# Patient Record
Sex: MALE | Race: OTHER | HISPANIC OR LATINO | Employment: UNEMPLOYED | URBAN - METROPOLITAN AREA
[De-identification: names, ages, dates, MRNs, and addresses within clinical notes are randomized per-mention and may not be internally consistent; named-entity substitution may affect disease eponyms.]

---

## 2023-06-02 ENCOUNTER — HOSPITAL ENCOUNTER (EMERGENCY)
Facility: HOSPITAL | Age: 48
Discharge: ANOTHER HOSPITAL | End: 2023-06-02
Attending: EMERGENCY MEDICINE
Payer: MEDICAID

## 2023-06-02 ENCOUNTER — HOSPITAL ENCOUNTER (OUTPATIENT)
Facility: HOSPITAL | Age: 48
Discharge: HOME | End: 2023-06-05
Attending: INTERNAL MEDICINE | Admitting: INTERNAL MEDICINE
Payer: MEDICAID

## 2023-06-02 VITALS
TEMPERATURE: 98 F | DIASTOLIC BLOOD PRESSURE: 92 MMHG | HEIGHT: 69 IN | OXYGEN SATURATION: 99 % | SYSTOLIC BLOOD PRESSURE: 139 MMHG | WEIGHT: 204 LBS | RESPIRATION RATE: 20 BRPM | HEART RATE: 70 BPM | BODY MASS INDEX: 30.21 KG/M2

## 2023-06-02 DIAGNOSIS — K92.2 UGIB (UPPER GASTROINTESTINAL BLEED): ICD-10-CM

## 2023-06-02 DIAGNOSIS — K92.2 GI BLEED: ICD-10-CM

## 2023-06-02 DIAGNOSIS — D64.9 ANEMIA, UNSPECIFIED TYPE: ICD-10-CM

## 2023-06-02 DIAGNOSIS — R55 SYNCOPE: ICD-10-CM

## 2023-06-02 DIAGNOSIS — K92.1 MELENA: Primary | ICD-10-CM

## 2023-06-02 LAB
ALBUMIN SERPL-MCNC: 3.3 G/DL (ref 3.5–5)
ALBUMIN/GLOB SERPL: 1.2 RATIO (ref 1.1–2)
ALP SERPL-CCNC: 49 UNIT/L (ref 40–150)
ALT SERPL-CCNC: 26 UNIT/L (ref 0–55)
AMPHET UR QL SCN: NEGATIVE
APPEARANCE UR: CLEAR
APTT PPP: 23.6 SECONDS
AST SERPL-CCNC: 14 UNIT/L (ref 5–34)
BACTERIA #/AREA URNS AUTO: ABNORMAL /HPF
BARBITURATE SCN PRESENT UR: NEGATIVE
BASOPHILS # BLD AUTO: 0.03 X10(3)/MCL
BASOPHILS NFR BLD AUTO: 0.4 %
BENZODIAZ UR QL SCN: NEGATIVE
BILIRUB UR QL STRIP.AUTO: NEGATIVE MG/DL
BILIRUBIN DIRECT+TOT PNL SERPL-MCNC: 0.3 MG/DL
BUN SERPL-MCNC: 41.7 MG/DL (ref 8.9–20.6)
CALCIUM SERPL-MCNC: 8.9 MG/DL (ref 8.4–10.2)
CANNABINOIDS UR QL SCN: NEGATIVE
CHLORIDE SERPL-SCNC: 109 MMOL/L (ref 98–107)
CO2 SERPL-SCNC: 25 MMOL/L (ref 22–29)
COCAINE UR QL SCN: NEGATIVE
COLOR UR: COLORLESS
CREAT SERPL-MCNC: 0.88 MG/DL (ref 0.73–1.18)
EOSINOPHIL # BLD AUTO: 0.02 X10(3)/MCL (ref 0–0.9)
EOSINOPHIL NFR BLD AUTO: 0.2 %
ERYTHROCYTE [DISTWIDTH] IN BLOOD BY AUTOMATED COUNT: 13.5 % (ref 11.5–17)
ETHANOL SERPL-MCNC: <10 MG/DL
FENTANYL UR QL SCN: NEGATIVE
GFR SERPLBLD CREATININE-BSD FMLA CKD-EPI: >60 MLS/MIN/1.73/M2
GLOBULIN SER-MCNC: 2.8 GM/DL (ref 2.4–3.5)
GLUCOSE SERPL-MCNC: 111 MG/DL (ref 74–100)
GLUCOSE UR QL STRIP.AUTO: NORMAL MG/DL
GROUP & RH: NORMAL
HCT VFR BLD AUTO: 27.8 % (ref 42–52)
HCT VFR BLD AUTO: 28.1 % (ref 42–52)
HCT VFR BLD AUTO: 31.5 % (ref 42–52)
HGB BLD-MCNC: 10.5 G/DL (ref 14–18)
HGB BLD-MCNC: 9.2 G/DL (ref 14–18)
HGB BLD-MCNC: 9.3 G/DL (ref 14–18)
HYALINE CASTS #/AREA URNS LPF: ABNORMAL /LPF
IMM GRANULOCYTES # BLD AUTO: 0.03 X10(3)/MCL (ref 0–0.04)
IMM GRANULOCYTES NFR BLD AUTO: 0.4 %
INDIRECT COOMBS GEL: NORMAL
KETONES UR QL STRIP.AUTO: ABNORMAL MG/DL
LEUKOCYTE ESTERASE UR QL STRIP.AUTO: NEGATIVE UNIT/L
LIPASE SERPL-CCNC: 24 U/L
LYMPHOCYTES # BLD AUTO: 2.44 X10(3)/MCL (ref 0.6–4.6)
LYMPHOCYTES NFR BLD AUTO: 28.7 %
MCH RBC QN AUTO: 30 PG (ref 27–31)
MCHC RBC AUTO-ENTMCNC: 33.3 G/DL (ref 33–36)
MCV RBC AUTO: 90 FL (ref 80–94)
MDMA UR QL SCN: NEGATIVE
MONOCYTES # BLD AUTO: 0.64 X10(3)/MCL (ref 0.1–1.3)
MONOCYTES NFR BLD AUTO: 7.5 %
MUCOUS THREADS URNS QL MICRO: ABNORMAL /LPF
NEUTROPHILS # BLD AUTO: 5.35 X10(3)/MCL (ref 2.1–9.2)
NEUTROPHILS NFR BLD AUTO: 62.8 %
NITRITE UR QL STRIP.AUTO: NEGATIVE
NRBC BLD AUTO-RTO: 0 %
OPIATES UR QL SCN: NEGATIVE
PCP UR QL: NEGATIVE
PH UR STRIP.AUTO: 5 [PH]
PH UR: 5 [PH] (ref 3–11)
PLATELET # BLD AUTO: 194 X10(3)/MCL (ref 130–400)
PMV BLD AUTO: 11.8 FL (ref 7.4–10.4)
POCT GLUCOSE: 130 MG/DL (ref 70–110)
POTASSIUM SERPL-SCNC: 4.7 MMOL/L (ref 3.5–5.1)
PROT SERPL-MCNC: 6.1 GM/DL (ref 6.4–8.3)
PROT UR QL STRIP.AUTO: NEGATIVE MG/DL
RBC # BLD AUTO: 3.5 X10(6)/MCL (ref 4.7–6.1)
RBC #/AREA URNS AUTO: ABNORMAL /HPF
RBC UR QL AUTO: NEGATIVE UNIT/L
SARS-COV-2 RDRP RESP QL NAA+PROBE: NEGATIVE
SODIUM SERPL-SCNC: 139 MMOL/L (ref 136–145)
SP GR UR STRIP.AUTO: 1.03
SPECIFIC GRAVITY, URINE AUTO (.000) (OHS): 1.03 (ref 1–1.03)
SPECIMEN OUTDATE: NORMAL
SQUAMOUS #/AREA URNS LPF: ABNORMAL /HPF
TROPONIN I SERPL-MCNC: <0.01 NG/ML (ref 0–0.04)
UROBILINOGEN UR STRIP-ACNC: NORMAL MG/DL
WBC # SPEC AUTO: 8.51 X10(3)/MCL (ref 4.5–11.5)
WBC #/AREA URNS AUTO: ABNORMAL /HPF

## 2023-06-02 PROCEDURE — 93005 ELECTROCARDIOGRAM TRACING: CPT

## 2023-06-02 PROCEDURE — 82962 GLUCOSE BLOOD TEST: CPT

## 2023-06-02 PROCEDURE — C9113 INJ PANTOPRAZOLE SODIUM, VIA: HCPCS | Performed by: INTERNAL MEDICINE

## 2023-06-02 PROCEDURE — 25000003 PHARM REV CODE 250: Performed by: INTERNAL MEDICINE

## 2023-06-02 PROCEDURE — 80053 COMPREHEN METABOLIC PANEL: CPT | Performed by: EMERGENCY MEDICINE

## 2023-06-02 PROCEDURE — 85730 THROMBOPLASTIN TIME PARTIAL: CPT | Performed by: EMERGENCY MEDICINE

## 2023-06-02 PROCEDURE — G0378 HOSPITAL OBSERVATION PER HR: HCPCS

## 2023-06-02 PROCEDURE — 63600175 PHARM REV CODE 636 W HCPCS: Performed by: EMERGENCY MEDICINE

## 2023-06-02 PROCEDURE — C9113 INJ PANTOPRAZOLE SODIUM, VIA: HCPCS | Performed by: EMERGENCY MEDICINE

## 2023-06-02 PROCEDURE — 87635 SARS-COV-2 COVID-19 AMP PRB: CPT | Performed by: EMERGENCY MEDICINE

## 2023-06-02 PROCEDURE — 85025 COMPLETE CBC W/AUTO DIFF WBC: CPT | Performed by: EMERGENCY MEDICINE

## 2023-06-02 PROCEDURE — 96361 HYDRATE IV INFUSION ADD-ON: CPT

## 2023-06-02 PROCEDURE — 96374 THER/PROPH/DIAG INJ IV PUSH: CPT

## 2023-06-02 PROCEDURE — 80307 DRUG TEST PRSMV CHEM ANLYZR: CPT | Performed by: EMERGENCY MEDICINE

## 2023-06-02 PROCEDURE — 63600175 PHARM REV CODE 636 W HCPCS: Performed by: INTERNAL MEDICINE

## 2023-06-02 PROCEDURE — 81001 URINALYSIS AUTO W/SCOPE: CPT | Performed by: EMERGENCY MEDICINE

## 2023-06-02 PROCEDURE — 84484 ASSAY OF TROPONIN QUANT: CPT | Performed by: EMERGENCY MEDICINE

## 2023-06-02 PROCEDURE — 85610 PROTHROMBIN TIME: CPT | Performed by: EMERGENCY MEDICINE

## 2023-06-02 PROCEDURE — 99291 CRITICAL CARE FIRST HOUR: CPT

## 2023-06-02 PROCEDURE — 82077 ASSAY SPEC XCP UR&BREATH IA: CPT | Performed by: EMERGENCY MEDICINE

## 2023-06-02 PROCEDURE — G0379 DIRECT REFER HOSPITAL OBSERV: HCPCS

## 2023-06-02 PROCEDURE — 86900 BLOOD TYPING SEROLOGIC ABO: CPT | Performed by: EMERGENCY MEDICINE

## 2023-06-02 PROCEDURE — 85014 HEMATOCRIT: CPT | Performed by: INTERNAL MEDICINE

## 2023-06-02 PROCEDURE — 85014 HEMATOCRIT: CPT | Performed by: EMERGENCY MEDICINE

## 2023-06-02 PROCEDURE — 83690 ASSAY OF LIPASE: CPT | Performed by: EMERGENCY MEDICINE

## 2023-06-02 RX ORDER — PANTOPRAZOLE SODIUM 40 MG/10ML
80 INJECTION, POWDER, LYOPHILIZED, FOR SOLUTION INTRAVENOUS
Status: COMPLETED | OUTPATIENT
Start: 2023-06-02 | End: 2023-06-02

## 2023-06-02 RX ORDER — ACETAMINOPHEN 325 MG/1
650 TABLET ORAL EVERY 4 HOURS PRN
Status: DISCONTINUED | OUTPATIENT
Start: 2023-06-02 | End: 2023-06-05 | Stop reason: HOSPADM

## 2023-06-02 RX ORDER — NALOXONE HCL 0.4 MG/ML
0.02 VIAL (ML) INJECTION
Status: DISCONTINUED | OUTPATIENT
Start: 2023-06-02 | End: 2023-06-05 | Stop reason: HOSPADM

## 2023-06-02 RX ORDER — SODIUM CHLORIDE, SODIUM LACTATE, POTASSIUM CHLORIDE, CALCIUM CHLORIDE 600; 310; 30; 20 MG/100ML; MG/100ML; MG/100ML; MG/100ML
1000 INJECTION, SOLUTION INTRAVENOUS CONTINUOUS
Status: DISCONTINUED | OUTPATIENT
Start: 2023-06-02 | End: 2023-06-02 | Stop reason: HOSPADM

## 2023-06-02 RX ORDER — SODIUM CHLORIDE, SODIUM LACTATE, POTASSIUM CHLORIDE, CALCIUM CHLORIDE 600; 310; 30; 20 MG/100ML; MG/100ML; MG/100ML; MG/100ML
INJECTION, SOLUTION INTRAVENOUS CONTINUOUS
Status: DISCONTINUED | OUTPATIENT
Start: 2023-06-02 | End: 2023-06-03

## 2023-06-02 RX ORDER — SODIUM CHLORIDE 0.9 % (FLUSH) 0.9 %
2 SYRINGE (ML) INJECTION EVERY 12 HOURS PRN
Status: DISCONTINUED | OUTPATIENT
Start: 2023-06-02 | End: 2023-06-05 | Stop reason: HOSPADM

## 2023-06-02 RX ADMIN — SODIUM CHLORIDE, POTASSIUM CHLORIDE, SODIUM LACTATE AND CALCIUM CHLORIDE: 600; 310; 30; 20 INJECTION, SOLUTION INTRAVENOUS at 03:06

## 2023-06-02 RX ADMIN — PANTOPRAZOLE SODIUM 40 MG: 40 INJECTION, POWDER, FOR SOLUTION INTRAVENOUS at 09:06

## 2023-06-02 RX ADMIN — SODIUM CHLORIDE, POTASSIUM CHLORIDE, SODIUM LACTATE AND CALCIUM CHLORIDE 1000 ML: 600; 310; 30; 20 INJECTION, SOLUTION INTRAVENOUS at 12:06

## 2023-06-02 RX ADMIN — PANTOPRAZOLE SODIUM 80 MG: 40 INJECTION, POWDER, FOR SOLUTION INTRAVENOUS at 12:06

## 2023-06-02 NOTE — ED PROVIDER NOTES
Encounter Date: 6/2/2023       History     Chief Complaint   Patient presents with    Loss of Consciousness    Melena     PT W REPORT OF WEAKNESS W SYNCOPE X 2 THIS AM. CO ABD PAIN W NVD, REPORTS BLACK STOOL AND NECK PAIN.  NO FEVER.  .  EKG OBTAINED.       He is here with his wife, Khmer-speaking only, originally from [place], history and exam performed with the assistance of a video Khmer .  He reports that he has an underlying history of gastroesophageal reflux, previously had some type of gastritis possibly associated with alcohol use but has not had alcohol in many years.  Other past history includes appendectomy but no history of GI bleed, ulcer, or other significant problem.  He reports that he took some stomach acid medicine in the past and his gastritis resolved.  He has been in his usual state of health recently, but reports that beginning early this morning he had some type of abdominal discomfort associated with dizziness, a fall in the bathroom at home, low blood pressure, diaphoresis, and black stools on 2 occasions.  It sounds as though he passed out or nearly passed out associated with generalized weakness.  He says he checks his blood pressure at home and it was low.  No recent use of aspirin, nonsteroidal anti-inflammatory medicines, alcohol, or other things known to have gastric toxicity.  No chest pain, localizing abdominal pain, nausea, vomiting, diarrhea, or urinary complaints.  No use of aspirin or Pepto-Bismol.  He recalls at about midnight last night he cough or regurgitated up a small amount of red blood.  He does not report significant abdominal pain.  No other problems identified.    The history is provided by the patient and the spouse. The history is limited by a language barrier. A  was used.   Review of patient's allergies indicates:  No Known Allergies  History reviewed. No pertinent past medical history.  History reviewed. No pertinent  surgical history.  History reviewed. No pertinent family history.  Social History     Tobacco Use    Smoking status: Never    Smokeless tobacco: Never   Substance Use Topics    Alcohol use: Not Currently    Drug use: Not Currently     Review of Systems   Constitutional:  Negative for chills and fever.   HENT:  Negative for congestion, facial swelling, nosebleeds and sinus pressure.    Eyes:  Negative for pain and redness.   Respiratory:  Negative for chest tightness, shortness of breath and wheezing.    Cardiovascular:  Negative for chest pain, palpitations and leg swelling.   Gastrointestinal:  Negative for abdominal distention, abdominal pain, diarrhea, nausea and vomiting.        See HPI   Endocrine: Negative for cold intolerance, polydipsia and polyphagia.   Genitourinary:  Negative for difficulty urinating, dysuria, frequency and hematuria.   Musculoskeletal:  Negative for arthralgias, back pain, myalgias and neck pain.   Skin:  Negative for color change and rash.   Neurological:  Positive for syncope and weakness. Negative for dizziness, numbness and headaches.   Hematological:  Negative for adenopathy. Does not bruise/bleed easily.   Psychiatric/Behavioral:  Negative for agitation and behavioral problems.    All other systems reviewed and are negative.    Physical Exam     Initial Vitals [06/02/23 0917]   BP Pulse Resp Temp SpO2   106/70 90 18 97.5 °F (36.4 °C) 100 %      MAP       --         Physical Exam    Nursing note and vitals reviewed.  Constitutional: He appears well-developed and well-nourished. He is not diaphoretic. No distress.   HENT:   Head: Normocephalic and atraumatic.   Mouth/Throat: Oropharynx is clear and moist. No oropharyngeal exudate.   Eyes: Conjunctivae and EOM are normal. Pupils are equal, round, and reactive to light. Right eye exhibits no discharge. Left eye exhibits no discharge. No scleral icterus.   Neck: Neck supple. No thyromegaly present. No tracheal deviation present. No JVD  present.   Normal range of motion.  Cardiovascular:  Normal rate, regular rhythm and normal heart sounds.     Exam reveals no gallop and no friction rub.       No murmur heard.  Pulmonary/Chest: Breath sounds normal. No respiratory distress. He has no wheezes. He has no rhonchi. He has no rales. He exhibits no tenderness.   Abdominal: Abdomen is soft. Bowel sounds are normal. He exhibits no distension and no mass. There is no abdominal tenderness. There is no rebound and no guarding.   Genitourinary:    Genitourinary Comments: Small amount melena in the rectal vault that tests very strongly heme-positive     Musculoskeletal:         General: No tenderness or edema. Normal range of motion.      Cervical back: Normal range of motion and neck supple.     Lymphadenopathy:     He has no cervical adenopathy.   Neurological: He is alert and oriented to person, place, and time. He has normal strength. No cranial nerve deficit.   Skin: Skin is warm and dry. No rash noted. No erythema.   Psychiatric: He has a normal mood and affect. His behavior is normal. Judgment and thought content normal.       ED Course   Critical Care    Date/Time: 6/2/2023 11:47 AM  Performed by: Darion Arevalo MD  Authorized by: Darion Arevalo MD   Direct patient critical care time: 10 minutes  Ordering / reviewing critical care time: 10 minutes  Documentation critical care time: 10 minutes  Consulting other physicians critical care time: 5 minutes  Total critical care time (exclusive of procedural time) : 35 minutes  Critical care time was exclusive of separately billable procedures and treating other patients and teaching time.  Critical care was necessary to treat or prevent imminent or life-threatening deterioration of the following conditions: circulatory failure.  Critical care was time spent personally by me on the following activities: development of treatment plan with patient or surrogate, evaluation of patient's response to treatment,  examination of patient, obtaining history from patient or surrogate, ordering and performing treatments and interventions, ordering and review of laboratory studies, ordering and review of radiographic studies, pulse oximetry and re-evaluation of patient's condition.      Labs Reviewed   COMPREHENSIVE METABOLIC PANEL - Abnormal; Notable for the following components:       Result Value    Chloride 109 (*)     Glucose Level 111 (*)     Blood Urea Nitrogen 41.7 (*)     Protein Total 6.1 (*)     Albumin Level 3.3 (*)     All other components within normal limits   CBC WITH DIFFERENTIAL - Abnormal; Notable for the following components:    RBC 3.50 (*)     Hgb 10.5 (*)     Hct 31.5 (*)     MPV 11.8 (*)     All other components within normal limits   URINALYSIS, REFLEX TO URINE CULTURE - Abnormal; Notable for the following components:    Color, UA Colorless (*)     Ketones, UA 1+ (*)     Mucous, UA Trace (*)     All other components within normal limits   POCT GLUCOSE - Abnormal; Notable for the following components:    POCT Glucose 130 (*)     All other components within normal limits   LIPASE - Normal   TROPONIN I - Normal   APTT - Normal   DRUG SCREEN, URINE (BEAKER) - Normal    Narrative:     Cut off concentrations:    Amphetamines - 1000 ng/ml  Barbiturates - 200 ng/ml  Benzodiazepine - 200 ng/ml  Cannabinoids (THC) - 50 ng/ml  Cocaine - 300 ng/ml  Fentanyl - 1.0 ng/ml  MDMA - 500 ng/ml  Opiates - 300 ng/ml   Phencyclidine (PCP) - 25 ng/ml    Specimen submitted for drug analysis and tested for pH and specific gravity in order to evaluate sample integrity. Suspect tampering if specific gravity is <1.003 and/or pH is not within the range of 4.5 - 8.0  False negatives may result form substances such as bleach added to urine.  False positives may result for the presence of a substance with similar chemical structure to the drug or its metabolite.    This test provides only a PRELIMINARY analytical test result. A more  specific alternate chemical method must be used in order to obtain a confirmed analytical result. Gas chromatography/mass spectrometry (GC/MS) is the preferred confirmatory method. Other chemical confirmation methods are available. Clinical consideration and professional judgement should be applied to any drug of abuse test result, particularly when preliminary positive results are used.    Positive results will be confirmed only at the physicians request. Unconfirmed screening results are to be used only for medical purposes (treatment).        ALCOHOL,MEDICAL (ETHANOL) - Normal   SARS-COV-2 RNA AMPLIFICATION, QUAL - Normal    Narrative:     The IDNOW COVID-19 assay is a rapid molecular in vitro diagnostic test utilizing an isothermal nucleic acid amplification technology intended for the qualitative detection of nucleic acid from the SARS-CoV-2 viral RNA in direct nasal, nasopharyngeal or throat swabs from individuals who are suspected of COVID-19 by their healthcare provider.   CBC W/ AUTO DIFFERENTIAL    Narrative:     The following orders were created for panel order CBC auto differential.  Procedure                               Abnormality         Status                     ---------                               -----------         ------                     CBC with Differential[[id number]]        Abnormal            Final result                 Please view results for these tests on the individual orders.   PROTIME-INR   EXTRA TUBES    Narrative:     The following orders were created for panel order EXTRA TUBES.  Procedure                               Abnormality         Status                     ---------                               -----------         ------                     Light Blue Top Hold[[id number]]                              In process                 Red Top Hold[[id number]]                                     In process                 Gold Top Hold[[id number]]                                     In process                 Pink Top Hold[[id number]]                                    In process                   Please view results for these tests on the individual orders.   LIGHT BLUE TOP HOLD   RED TOP HOLD   GOLD TOP HOLD   PINK TOP HOLD   HEMOGLOBIN AND HEMATOCRIT, BLOOD   TYPE & SCREEN     EKG Readings: (Independently Interpreted)   Initial Reading: No STEMI. Rhythm: Normal Sinus Rhythm. Heart Rate: 87.   Normal sinus rhythm at 87 beats per minute, short DC interval, nonspecific T-wave abnormality, slightly noisy baseline, prolonged QT with  milliseconds. No prior to compare.   ECG Results              EKG 12-lead (Final result)  Result time 06/02/23 12:49:47      Final result by Interface, Lab In [facility] (06/02/23 12:49:47)                   Narrative:    Test Reason : R55,    Vent. Rate : 087 BPM     Atrial Rate : 087 BPM     P-R Int : 098 ms          QRS Dur : 080 ms      QT Int : 396 ms       P-R-T Axes : 021 053 -16 degrees     QTc Int : 476 ms    Sinus rhythm with short DC  Nonspecific T wave abnormality  Prolonged QT  Abnormal ECG  No previous ECGs available  Confirmed by Tim Torres MD ([id number]) on 6/2/2023 12:49:36 PM    Referred By:             Confirmed By:Tim Torres MD                                  Imaging Results              X-Ray Chest PA And Lateral (Final result)  Result time 06/02/23 12:31:59      Final result by Fabi Chan MD (06/02/23 12:31:59)                   Impression:      No acute abnormality of the chest.      Electronically signed by: Fabi Chan  Date:    06/02/2023  Time:    12:31               Narrative:    EXAMINATION:  XR CHEST PA AND LATERAL    CLINICAL HISTORY:  syncope;    TECHNIQUE:  PA and lateral chest radiographs    COMPARISON:  None.    FINDINGS:  The heart is normal in size.  The lungs are clear.  There is no pleural effusion or visible pneumothorax.                                        11:48 AM Clinically stable.  No further stool  production while in the ER, no change in symptoms.  History reviewed again with patient and his wife via video Guinean .  No meaningful additional information obtained.  Proceeding with evaluation and treatment of symptomatic upper GI bleed.    12:09 PM Verified no GI coverage at this facility, will pursue transfer to an appropriately capable facility.      1:25 PM Accepted for transfer to [facility] - Dr. Darryl Vee. Stable for transfer.     Medications   lactated ringers infusion (1,000 mLs Intravenous New Bag 6/2/23 1206)   lactated ringers bolus 1,000 mL (0 mLs Intravenous Stopped 6/2/23 1328)   pantoprazole injection 80 mg (80 mg Intravenous Given 6/2/23 1207)         Medical Decision Making  Problems Addressed:  Anemia, unspecified type: acute illness or injury  Melena: acute illness or injury that poses a threat to life or bodily functions  UGIB (upper gastrointestinal bleed): acute illness or injury that poses a threat to life or bodily functions    Amount and/or Complexity of Data Reviewed  Labs: ordered. Decision-making details documented in ED Course.  Radiology: ordered. Decision-making details documented in ED Course.  ECG/medicine tests: ordered and independent interpretation performed. Decision-making details documented in ED Course.    Risk  Prescription drug management.  Decision regarding hospitalization.           Additional MDM:   Differential Diagnosis:   UGIB/ Anemia/ PUD/ other source of bleeding                        Clinical Impression:   Final diagnoses:  [R55] Syncope  [K92.1] Melena (Primary)  [K92.2] UGIB (upper gastrointestinal bleed)  [D64.9] Anemia, unspecified type        ED Disposition Condition    Transfer to Another Facility Stable                Darion Arevalo MD  06/02/23 3003

## 2023-06-02 NOTE — H&P
"Hospital Medicine  History & Physical Examination       Patient: Jesús Meng  MRN: [id number]  STATUS: OP- Observation   DOS: 6/2/2023   PCP: Primary Doctor No      CC: black stools       HISTORY OF PRESENT ILLNESS   47 y.o.  male with a history that includes GERD, presented to [facility] ED with dark stools, associated with generalized weakness and syncope.  First he started this morning with "coughing up" some blood x 1.  No further vomiting or even nausea; but then started having dark, "black" stools.  Subsequently noted lightheadedness, even passed out at one point. Notes a distant history gastritis that required endoscopy.  He was told that gastritis was at least partially associated to his alcohol use.  His issues resolved and he has done well since, however does note intermittent heart burn for which he takes as needed OTC famotidine.  He notes he drinks occasionally, but he quit any heavy/regular drinking since his prior episode of gastritis.   Work up in ED noted H&H 9.3/27, heme-positive stools.  Denies any NSAID use, or alcohol use.  Patient transferred to [facility] for endoscopy.      REVIEW OF SYSTEMS   Except as documented, all other systems reviewed and negative       PAST MEDICAL HISTORY   GERD      PAST SURGICAL HISTORY   Appendectomy      FAMILY HISTORY   Reviewed, negative in relation to patient's current condition.      SOCIAL HISTORY   Denies alcohol, tobacco or drug abuse.      ALLERGIES   NKDA      HOME MEDICATIONS   None       PHYSICAL EXAM   VITALS: T     BP     P     RR     O2      GENERAL: Awake and in NAD  HEENT: NC/AT, EOMI, PERRL.  NECK: Supple,  No JVD  LUNGS: CTA B/L  CVS: RRR, S1S2 positive  GI/: Soft, NT/ND, bowel sounds positive.  EXTREMITIES: Peripheral pulses 2+, no peripheral edema  DERM: Warm, dry.  No rashes or lesions noted.  NEURO: AAOx3, no focal neurologic deficit  PSYCH: Cooperative, appropriate mood and affect       DIAGNOSTICS     Recent Labs     06/02/23  1048 " 06/02/23  1403   WBC 8.51  --    RBC 3.50*  --    HGB 10.5* 9.3*   HCT 31.5* 27.8*   MCV 90.0  --    MCH 30.0  --    MCHC 33.3  --    RDW 13.5  --      --      Recent Labs     06/02/23  1048   INR 1.03        Recent Labs     06/02/23  1048      K 4.7   CHLORIDE 109*   CO2 25   BUN 41.7*   CREATININE 0.88   GLUCOSE 111*   CALCIUM 8.9   ALBUMIN 3.3*   GLOBULIN 2.8   ALKPHOS 49   ALT 26   AST 14   BILITOT 0.3   LIPASE 24     Recent Labs     06/02/23  1048   TROPONINI <0.010        X-Ray Chest PA And Lateral  Narrative:   The heart is normal in size.  The lungs are clear.  There is no pleural effusion or visible pneumothorax.  Impression:   No acute abnormality of the chest.  Electronically signed by: Fabi Chan  Date:    06/02/2023  Time:    12:31      ASSESSMENT   Acute GI Bleed, suspect upper/gastritis  Acute blood loss anemia    PLAN   Start IV PPI BID, monitor H&H  Can transfuse for Hb <7  Ok for clears, NPO after MN  Surgery consulted for endoscopy    Prophylaxis: B/L SCDs  Code Status: Full      Darryl Sifuentes MD  Hospital Medicine        If the patient has been admitted under observation status, it is at my discretion and under our care with hospital medicine services. [TWA]

## 2023-06-03 ENCOUNTER — ANESTHESIA (OUTPATIENT)
Dept: SURGERY | Facility: HOSPITAL | Age: 48
End: 2023-06-03
Payer: MEDICAID

## 2023-06-03 ENCOUNTER — ANESTHESIA EVENT (OUTPATIENT)
Dept: SURGERY | Facility: HOSPITAL | Age: 48
End: 2023-06-03
Payer: MEDICAID

## 2023-06-03 PROBLEM — K22.10 ULCER OF ESOPHAGUS WITHOUT BLEEDING: Status: ACTIVE | Noted: 2023-06-03

## 2023-06-03 LAB
ANION GAP SERPL CALC-SCNC: 5 MEQ/L
BUN SERPL-MCNC: 26 MG/DL (ref 8.9–20.6)
CALCIUM SERPL-MCNC: 8.7 MG/DL (ref 8.4–10.2)
CHLORIDE SERPL-SCNC: 109 MMOL/L (ref 98–107)
CO2 SERPL-SCNC: 26 MMOL/L (ref 22–29)
CREAT SERPL-MCNC: 0.77 MG/DL (ref 0.73–1.18)
CREAT/UREA NIT SERPL: 34
GFR SERPLBLD CREATININE-BSD FMLA CKD-EPI: >60 MLS/MIN/1.73/M2
GLUCOSE SERPL-MCNC: 99 MG/DL (ref 74–100)
HCT VFR BLD AUTO: 27.3 % (ref 42–52)
HCT VFR BLD AUTO: 27.7 % (ref 42–52)
HGB BLD-MCNC: 8.9 G/DL (ref 14–18)
HGB BLD-MCNC: 9.2 G/DL (ref 14–18)
POTASSIUM SERPL-SCNC: 4 MMOL/L (ref 3.5–5.1)
SODIUM SERPL-SCNC: 140 MMOL/L (ref 136–145)

## 2023-06-03 PROCEDURE — 25000003 PHARM REV CODE 250: Performed by: INTERNAL MEDICINE

## 2023-06-03 PROCEDURE — D9220A PRA ANESTHESIA: ICD-10-PCS | Mod: ,,, | Performed by: NURSE ANESTHETIST, CERTIFIED REGISTERED

## 2023-06-03 PROCEDURE — 94761 N-INVAS EAR/PLS OXIMETRY MLT: CPT

## 2023-06-03 PROCEDURE — D9220A PRA ANESTHESIA: Mod: ,,, | Performed by: NURSE ANESTHETIST, CERTIFIED REGISTERED

## 2023-06-03 PROCEDURE — 00731 ANES UPR GI NDSC PX NOS: CPT | Performed by: SURGERY

## 2023-06-03 PROCEDURE — 63600175 PHARM REV CODE 636 W HCPCS: Performed by: NURSE ANESTHETIST, CERTIFIED REGISTERED

## 2023-06-03 PROCEDURE — 80048 BASIC METABOLIC PNL TOTAL CA: CPT | Performed by: INTERNAL MEDICINE

## 2023-06-03 PROCEDURE — 43235 EGD DIAGNOSTIC BRUSH WASH: CPT | Performed by: SURGERY

## 2023-06-03 PROCEDURE — C9113 INJ PANTOPRAZOLE SODIUM, VIA: HCPCS | Performed by: INTERNAL MEDICINE

## 2023-06-03 PROCEDURE — 37000008 HC ANESTHESIA 1ST 15 MINUTES: Performed by: SURGERY

## 2023-06-03 PROCEDURE — G0378 HOSPITAL OBSERVATION PER HR: HCPCS

## 2023-06-03 PROCEDURE — 63600175 PHARM REV CODE 636 W HCPCS: Performed by: INTERNAL MEDICINE

## 2023-06-03 PROCEDURE — 25000003 PHARM REV CODE 250: Performed by: NURSE ANESTHETIST, CERTIFIED REGISTERED

## 2023-06-03 PROCEDURE — 85014 HEMATOCRIT: CPT | Performed by: INTERNAL MEDICINE

## 2023-06-03 RX ORDER — PROPOFOL 10 MG/ML
VIAL (ML) INTRAVENOUS
Status: DISCONTINUED | OUTPATIENT
Start: 2023-06-03 | End: 2023-06-03

## 2023-06-03 RX ORDER — LIDOCAINE HYDROCHLORIDE 20 MG/ML
INJECTION, SOLUTION EPIDURAL; INFILTRATION; INTRACAUDAL; PERINEURAL
Status: DISCONTINUED | OUTPATIENT
Start: 2023-06-03 | End: 2023-06-03

## 2023-06-03 RX ADMIN — PANTOPRAZOLE SODIUM 40 MG: 40 INJECTION, POWDER, FOR SOLUTION INTRAVENOUS at 09:06

## 2023-06-03 RX ADMIN — PROPOFOL 50 MG: 10 INJECTION, EMULSION INTRAVENOUS at 08:06

## 2023-06-03 RX ADMIN — PROPOFOL 150 MG: 10 INJECTION, EMULSION INTRAVENOUS at 08:06

## 2023-06-03 RX ADMIN — PANTOPRAZOLE SODIUM 40 MG: 40 INJECTION, POWDER, FOR SOLUTION INTRAVENOUS at 08:06

## 2023-06-03 RX ADMIN — LIDOCAINE HYDROCHLORIDE 60 MG: 20 INJECTION, SOLUTION EPIDURAL; INFILTRATION; INTRACAUDAL; PERINEURAL at 08:06

## 2023-06-03 NOTE — HOSPITAL COURSE
6/3/23-Patient underwent EGD today and was found to have a small ulceration.  H&H remained stable.  Surgery would like to check labs in am and keep on clears for now.  Otherwise the patient is stable.    6/4/23-Patient is doing well and voices no complaints at this time.  H&H did drop slightly likely due to some dilution.  Surgery wants to repeat labs in am.    6/5/23-Patient is doing well at this time.  He was found to have a small ulceration with no active bleeding.  He iss table for discharge home today.  Exam(A&O, NAD, RRR, CTA, BS +, ROM I)

## 2023-06-03 NOTE — OP NOTE
Procedure date:  06/03/2023      Indications:  47-year-old  male transferred from outside facility for evaluation of possible upper GI blood loss and anemia.  Patient consented through interpretation system for diagnostic EGD.      Preoperative diagnosis:  1. Anemia 2. Suspicion of GI blood loss  Postoperative diagnosis:  1. Anemia 2. Superficial esophageal ulcer    Procedure performed:  Diagnostic EGD    Procedure in detail:  Patient was brought to the endoscopy suite laid in a slight supine position.  Intravenous anesthesia was provided.  Oral bite plate placed in the mouth.  An endoscope was then passed through the oropharynx intubating the esophagus with easy transit into the stomach.  Upon entry into the stomach was fully insufflated for evaluation.  Overall the gastric lumen appeared to be grossly normal.  There was no active bleeding noted.  The scope was then advanced into duodenum reaching the 2nd and 3rd portions which was also grossly normal.  It was withdrawn back in the stomach retroflexed revealing a normal appearing cardia fundus and proximal body.  It was returned to neutral position the stomach was evacuated of air.  The scope was withdrawn back to the Z-line which measured about 40 cm from the incisors.  There was a small superficial ulceration of the posterior aspect of the esophagus without active bleeding noted.  This was possible site for bleeding.  Scope was then withdrawn in neutral position the remainder of the esophagus appeared to be grossly normal otherwise.  The scope was removed patient was relieved of anesthesia stable condition and transferred to postanesthesia care unit.      Complications:  None   Estimated blood loss:  None   Specimens:  None     Disposition: Upon recovery from anesthesia patient will be transferred back to the floor for further medical management.  Patient's diet will be advanced to clear liquids and regular as tolerated.    Eli Loaiaz MD

## 2023-06-03 NOTE — PROGRESS NOTES
"[facility]  Hospital Medicine  Progress Note    Patient Name: Jesús Meng  MRN: [id number]  Patient Class: OP- Observation   Admission Date: 6/2/2023  Length of Stay: 0 days  Attending Physician: Darryl Sifuentes MD  Primary Care Provider: Primary Doctor No        Subjective:     Principal Problem:Ulcer of esophagus without bleeding        HPI:  47 y.o.  male with a history that includes GERD, presented to [facility] ED with dark stools, associated with generalized weakness and syncope.  First he started this morning with "coughing up" some blood x 1.  No further vomiting or even nausea; but then started having dark, "black" stools.  Subsequently noted lightheadedness, even passed out at one point. Notes a distant history gastritis that required endoscopy.  He was told that gastritis was at least partially associated to his alcohol use.  His issues resolved and he has done well since, however does note intermittent heart burn for which he takes as needed OTC famotidine.  He notes he drinks occasionally, but he quit any heavy/regular drinking since his prior episode of gastritis.   Work up in ED noted H&H 9.3/27, heme-positive stools.  Denies any NSAID use, or alcohol use.  Patient transferred to [facility] for endoscopy      Overview/Hospital Course:  6/3/23-Patient underwent EGD today and was found to have a small ulceration.  H&H remained stable.  Surgery would like to check labs in am and keep on clears for now.  Otherwise the patient is stable.      Interval History:     Review of Systems   Constitutional: Negative.    HENT: Negative.     Eyes: Negative.    Respiratory: Negative.     Cardiovascular: Negative.    Gastrointestinal:  Positive for blood in stool.   Endocrine: Negative.    Genitourinary: Negative.    Musculoskeletal: Negative.    Skin: Negative.    Allergic/Immunologic: Negative.    Neurological: Negative.    Hematological: Negative.    Psychiatric/Behavioral: " Negative.     Objective:     Vital Signs (Most Recent):  Temp: 98 °F (36.7 °C) (06/03/23 1220)  Pulse: 71 (06/03/23 1220)  Resp: 20 (06/03/23 0000)  BP: 135/87 (06/03/23 1220)  SpO2: 100 % (06/03/23 1220) Vital Signs (24h Range):  Temp:  [97.6 °F (36.4 °C)-98.6 °F (37 °C)] 98 °F (36.7 °C)  Pulse:  [67-77] 71  Resp:  [20] 20  SpO2:  [99 %-100 %] 100 %  BP: (110-152)/(77-97) 135/87     Weight: 92.5 kg (203 lb 14.8 oz)  Body mass index is 30.11 kg/m².    Intake/Output Summary (Last 24 hours) at 6/3/2023 1514  Last data filed at 6/3/2023 0816  Gross per 24 hour   Intake 100 ml   Output 0 ml   Net 100 ml         Physical Exam  Constitutional:       Appearance: Normal appearance. He is normal weight.   HENT:      Head: Normocephalic and atraumatic.      Nose: Nose normal.      Mouth/Throat:      Mouth: Mucous membranes are moist.      Pharynx: Oropharynx is clear.   Eyes:      Extraocular Movements: Extraocular movements intact.      Conjunctiva/sclera: Conjunctivae normal.      Pupils: Pupils are equal, round, and reactive to light.   Cardiovascular:      Rate and Rhythm: Normal rate.      Pulses: Normal pulses.      Heart sounds: Normal heart sounds.   Pulmonary:      Effort: Pulmonary effort is normal.      Breath sounds: Normal breath sounds.   Abdominal:      General: Bowel sounds are normal.      Palpations: Abdomen is soft.   Musculoskeletal:         General: Normal range of motion.      Cervical back: Normal range of motion and neck supple.   Skin:     General: Skin is warm and dry.      Capillary Refill: Capillary refill takes 2 to 3 seconds.   Neurological:      General: No focal deficit present.      Mental Status: He is alert and oriented to person, place, and time. Mental status is at baseline.   Psychiatric:         Mood and Affect: Mood normal.         Behavior: Behavior normal.         Thought Content: Thought content normal.         Judgment: Judgment normal.           Significant Labs: All pertinent labs  within the past 24 hours have been reviewed.  BMP:   Recent Labs   Lab 06/03/23  0338      K 4.0   CO2 26   BUN 26.0*   CREATININE 0.77   CALCIUM 8.7     CBC:   Recent Labs   Lab 06/02/23  1048 06/02/23  1403 06/02/23  1942 06/03/23  0338 06/03/23  1134   WBC 8.51  --   --   --   --    HGB 10.5*   < > 9.2* 9.2* 8.9*   HCT 31.5*   < > 28.1* 27.7* 27.3*     --   --   --   --     < > = values in this interval not displayed.     CMP:   Recent Labs   Lab 06/02/23  1048 06/03/23  0338    140   K 4.7 4.0   CO2 25 26   BUN 41.7* 26.0*   CREATININE 0.88 0.77   CALCIUM 8.9 8.7   ALBUMIN 3.3*  --    BILITOT 0.3  --    ALKPHOS 49  --    AST 14  --    ALT 26  --      Magnesium: No results for input(s): MG in the last 48 hours.    Significant Imaging: I have reviewed all pertinent imaging results/findings within the past 24 hours.      Assessment/Plan:      * Ulcer of esophagus without bleeding  PPI  Follow labs  Clears for now        VTE Risk Mitigation (From admission, onward)         Ordered     IP VTE HIGH RISK PATIENT  Once         06/02/23 1645     Place sequential compression device  Until discontinued         06/02/23 1645                Discharge Planning   TIMBO:      Code Status: Full Code   Is the patient medically ready for discharge?:     Reason for patient still in hospital (select all that apply): Patient trending condition, Laboratory test, Treatment, Consult recommendations and Pending disposition                     Alvin Day MD  Department of Hospital Medicine   [facility]

## 2023-06-03 NOTE — ANESTHESIA POSTPROCEDURE EVALUATION
Anesthesia Post Evaluation    Patient: Jesús Meng    Procedure(s) Performed: Procedure(s) (LRB):  EGD (ESOPHAGOGASTRODUODENOSCOPY) (N/A)    Final Anesthesia Type: general      Patient participation: Yes- Able to Participate  Level of consciousness: awake and alert and oriented  Post-procedure vital signs: reviewed and stable  Pain management: adequate  Airway patency: patent    PONV status at discharge: No PONV  Anesthetic complications: no      Cardiovascular status: stable  Respiratory status: unassisted, spontaneous ventilation and room air  Hydration status: euvolemic  Follow-up not needed.          Vitals Value Taken Time     06/03/23 0830     06/03/23 0830     06/03/23 0830     06/03/23 0830     06/03/23 0830         No case tracking events are documented in the log.      Pain/Luke Score: No data recorded

## 2023-06-03 NOTE — SUBJECTIVE & OBJECTIVE
Interval History:     Review of Systems   Constitutional: Negative.    HENT: Negative.     Eyes: Negative.    Respiratory: Negative.     Cardiovascular: Negative.    Gastrointestinal:  Positive for blood in stool.   Endocrine: Negative.    Genitourinary: Negative.    Musculoskeletal: Negative.    Skin: Negative.    Allergic/Immunologic: Negative.    Neurological: Negative.    Hematological: Negative.    Psychiatric/Behavioral: Negative.     Objective:     Vital Signs (Most Recent):  Temp: 98 °F (36.7 °C) (06/03/23 1220)  Pulse: 71 (06/03/23 1220)  Resp: 20 (06/03/23 0000)  BP: 135/87 (06/03/23 1220)  SpO2: 100 % (06/03/23 1220) Vital Signs (24h Range):  Temp:  [97.6 °F (36.4 °C)-98.6 °F (37 °C)] 98 °F (36.7 °C)  Pulse:  [67-77] 71  Resp:  [20] 20  SpO2:  [99 %-100 %] 100 %  BP: (110-152)/(77-97) 135/87     Weight: 92.5 kg (203 lb 14.8 oz)  Body mass index is 30.11 kg/m².    Intake/Output Summary (Last 24 hours) at 6/3/2023 1514  Last data filed at 6/3/2023 0816  Gross per 24 hour   Intake 100 ml   Output 0 ml   Net 100 ml         Physical Exam  Constitutional:       Appearance: Normal appearance. He is normal weight.   HENT:      Head: Normocephalic and atraumatic.      Nose: Nose normal.      Mouth/Throat:      Mouth: Mucous membranes are moist.      Pharynx: Oropharynx is clear.   Eyes:      Extraocular Movements: Extraocular movements intact.      Conjunctiva/sclera: Conjunctivae normal.      Pupils: Pupils are equal, round, and reactive to light.   Cardiovascular:      Rate and Rhythm: Normal rate.      Pulses: Normal pulses.      Heart sounds: Normal heart sounds.   Pulmonary:      Effort: Pulmonary effort is normal.      Breath sounds: Normal breath sounds.   Abdominal:      General: Bowel sounds are normal.      Palpations: Abdomen is soft.   Musculoskeletal:         General: Normal range of motion.      Cervical back: Normal range of motion and neck supple.   Skin:     General: Skin is warm and dry.       Capillary Refill: Capillary refill takes 2 to 3 seconds.   Neurological:      General: No focal deficit present.      Mental Status: He is alert and oriented to person, place, and time. Mental status is at baseline.   Psychiatric:         Mood and Affect: Mood normal.         Behavior: Behavior normal.         Thought Content: Thought content normal.         Judgment: Judgment normal.           Significant Labs: All pertinent labs within the past 24 hours have been reviewed.  BMP:   Recent Labs   Lab 06/03/23  0338      K 4.0   CO2 26   BUN 26.0*   CREATININE 0.77   CALCIUM 8.7     CBC:   Recent Labs   Lab 06/02/23  1048 06/02/23  1403 06/02/23  1942 06/03/23  0338 06/03/23  1134   WBC 8.51  --   --   --   --    HGB 10.5*   < > 9.2* 9.2* 8.9*   HCT 31.5*   < > 28.1* 27.7* 27.3*     --   --   --   --     < > = values in this interval not displayed.     CMP:   Recent Labs   Lab 06/02/23  1048 06/03/23  0338    140   K 4.7 4.0   CO2 25 26   BUN 41.7* 26.0*   CREATININE 0.88 0.77   CALCIUM 8.9 8.7   ALBUMIN 3.3*  --    BILITOT 0.3  --    ALKPHOS 49  --    AST 14  --    ALT 26  --      Magnesium: No results for input(s): MG in the last 48 hours.    Significant Imaging: I have reviewed all pertinent imaging results/findings within the past 24 hours.

## 2023-06-03 NOTE — HPI
"47 y.o.  male with a history that includes GERD, presented to [facility] ED with dark stools, associated with generalized weakness and syncope.  First he started this morning with "coughing up" some blood x 1.  No further vomiting or even nausea; but then started having dark, "black" stools.  Subsequently noted lightheadedness, even passed out at one point. Notes a distant history gastritis that required endoscopy.  He was told that gastritis was at least partially associated to his alcohol use.  His issues resolved and he has done well since, however does note intermittent heart burn for which he takes as needed OTC famotidine.  He notes he drinks occasionally, but he quit any heavy/regular drinking since his prior episode of gastritis.   Work up in ED noted H&H 9.3/27, heme-positive stools.  Denies any NSAID use, or alcohol use.  Patient transferred to [facility] for endoscopy  "

## 2023-06-03 NOTE — ANESTHESIA PREPROCEDURE EVALUATION
06/03/2023  Jesús Meng is a 47 y.o., male.      Pre-op Assessment    I have reviewed the Patient Summary Reports.     I have reviewed the Nursing Notes. I have reviewed the NPO Status.   I have reviewed the Medications.     Review of Systems  Anesthesia Hx:  No problems with previous Anesthesia  History of prior surgery of interest to airway management or planning: Denies Family Hx of Anesthesia complications.   Denies Personal Hx of Anesthesia complications.   Social:  Non-Smoker, No Alcohol Use    Hematology/Oncology:  Hematology Normal   Oncology Normal     EENT/Dental:EENT/Dental Normal   Cardiovascular:  Cardiovascular Normal     Pulmonary:  Pulmonary Normal    Renal/:  Renal/ Normal     Hepatic/GI:   PUD, GERD    Musculoskeletal:  Musculoskeletal Normal    Neurological:  Neurology Normal    Endocrine:  Endocrine Normal    Dermatological:  Skin Normal    Psych:  Psychiatric Normal           Physical Exam  General: Well nourished, Cooperative, Alert and Oriented    Airway:  Mallampati: II   Mouth Opening: Normal  TM Distance: Normal  Tongue: Normal  Neck ROM: Normal ROM    Dental:  Intact        Anesthesia Plan  Type of Anesthesia, risks & benefits discussed:    Anesthesia Type: Gen Natural Airway  Intra-op Monitoring Plan: Standard ASA Monitors  Induction:  IV  Informed Consent: Informed consent signed with the Patient and all parties understand the risks and agree with anesthesia plan.  All questions answered. Patient consented to blood products? Yes  ASA Score: 1 Emergent  Day of Surgery Review of History & Physical: I have interviewed and examined the patient. I have reviewed the patient's H&P dated: There are no significant changes.     Ready For Surgery From Anesthesia Perspective.     .

## 2023-06-04 LAB
ALBUMIN SERPL-MCNC: 3.1 G/DL (ref 3.5–5)
ALBUMIN/GLOB SERPL: 1.2 RATIO (ref 1.1–2)
ALP SERPL-CCNC: 43 UNIT/L (ref 40–150)
ALT SERPL-CCNC: 29 UNIT/L (ref 0–55)
AST SERPL-CCNC: 22 UNIT/L (ref 5–34)
BASOPHILS # BLD AUTO: 0.05 X10(3)/MCL
BASOPHILS NFR BLD AUTO: 0.9 %
BILIRUBIN DIRECT+TOT PNL SERPL-MCNC: 0.3 MG/DL
BUN SERPL-MCNC: 15 MG/DL (ref 8.9–20.6)
CALCIUM SERPL-MCNC: 8.6 MG/DL (ref 8.4–10.2)
CHLORIDE SERPL-SCNC: 109 MMOL/L (ref 98–107)
CO2 SERPL-SCNC: 26 MMOL/L (ref 22–29)
CREAT SERPL-MCNC: 0.96 MG/DL (ref 0.73–1.18)
EOSINOPHIL # BLD AUTO: 0.1 X10(3)/MCL (ref 0–0.9)
EOSINOPHIL NFR BLD AUTO: 1.8 %
ERYTHROCYTE [DISTWIDTH] IN BLOOD BY AUTOMATED COUNT: 13.2 % (ref 11.5–17)
GFR SERPLBLD CREATININE-BSD FMLA CKD-EPI: >60 MLS/MIN/1.73/M2
GLOBULIN SER-MCNC: 2.5 GM/DL (ref 2.4–3.5)
GLUCOSE SERPL-MCNC: 91 MG/DL (ref 74–100)
HCT VFR BLD AUTO: 25.6 % (ref 42–52)
HGB BLD-MCNC: 8.3 G/DL (ref 14–18)
IMM GRANULOCYTES # BLD AUTO: 0.01 X10(3)/MCL (ref 0–0.04)
IMM GRANULOCYTES NFR BLD AUTO: 0.2 %
LYMPHOCYTES # BLD AUTO: 2.61 X10(3)/MCL (ref 0.6–4.6)
LYMPHOCYTES NFR BLD AUTO: 46.1 %
MAGNESIUM SERPL-MCNC: 1.9 MG/DL (ref 1.6–2.6)
MCH RBC QN AUTO: 29.5 PG (ref 27–31)
MCHC RBC AUTO-ENTMCNC: 32.4 G/DL (ref 33–36)
MCV RBC AUTO: 91.1 FL (ref 80–94)
MONOCYTES # BLD AUTO: 0.43 X10(3)/MCL (ref 0.1–1.3)
MONOCYTES NFR BLD AUTO: 7.6 %
NEUTROPHILS # BLD AUTO: 2.46 X10(3)/MCL (ref 2.1–9.2)
NEUTROPHILS NFR BLD AUTO: 43.4 %
PLATELET # BLD AUTO: 171 X10(3)/MCL (ref 130–400)
PMV BLD AUTO: 12 FL (ref 7.4–10.4)
POTASSIUM SERPL-SCNC: 3.8 MMOL/L (ref 3.5–5.1)
PROT SERPL-MCNC: 5.6 GM/DL (ref 6.4–8.3)
RBC # BLD AUTO: 2.81 X10(6)/MCL (ref 4.7–6.1)
SODIUM SERPL-SCNC: 141 MMOL/L (ref 136–145)
WBC # SPEC AUTO: 5.66 X10(3)/MCL (ref 4.5–11.5)

## 2023-06-04 PROCEDURE — 80053 COMPREHEN METABOLIC PANEL: CPT | Performed by: INTERNAL MEDICINE

## 2023-06-04 PROCEDURE — C9113 INJ PANTOPRAZOLE SODIUM, VIA: HCPCS | Performed by: INTERNAL MEDICINE

## 2023-06-04 PROCEDURE — 83735 ASSAY OF MAGNESIUM: CPT | Performed by: INTERNAL MEDICINE

## 2023-06-04 PROCEDURE — 63600175 PHARM REV CODE 636 W HCPCS: Performed by: INTERNAL MEDICINE

## 2023-06-04 PROCEDURE — 85025 COMPLETE CBC W/AUTO DIFF WBC: CPT | Performed by: INTERNAL MEDICINE

## 2023-06-04 PROCEDURE — G0378 HOSPITAL OBSERVATION PER HR: HCPCS

## 2023-06-04 PROCEDURE — 94761 N-INVAS EAR/PLS OXIMETRY MLT: CPT

## 2023-06-04 PROCEDURE — 25000003 PHARM REV CODE 250: Performed by: INTERNAL MEDICINE

## 2023-06-04 RX ADMIN — PANTOPRAZOLE SODIUM 40 MG: 40 INJECTION, POWDER, FOR SOLUTION INTRAVENOUS at 09:06

## 2023-06-04 RX ADMIN — PANTOPRAZOLE SODIUM 40 MG: 40 INJECTION, POWDER, FOR SOLUTION INTRAVENOUS at 10:06

## 2023-06-04 NOTE — SUBJECTIVE & OBJECTIVE
Interval History:     Review of Systems   Constitutional: Negative.    HENT: Negative.     Eyes: Negative.    Respiratory: Negative.     Cardiovascular: Negative.    Gastrointestinal: Negative.    Endocrine: Negative.    Genitourinary: Negative.    Musculoskeletal: Negative.    Skin: Negative.    Allergic/Immunologic: Negative.    Neurological: Negative.    Hematological: Negative.    Psychiatric/Behavioral: Negative.     Objective:     Vital Signs (Most Recent):  Temp: 97.8 °F (36.6 °C) (06/04/23 1231)  Pulse: 61 (06/04/23 1231)  Resp: 20 (06/03/23 0000)  BP: (!) 140/85 (06/04/23 1231)  SpO2: 99 % (06/04/23 1231) Vital Signs (24h Range):  Temp:  [97.5 °F (36.4 °C)-99 °F (37.2 °C)] 97.8 °F (36.6 °C)  Pulse:  [61-74] 61  SpO2:  [96 %-100 %] 99 %  BP: (117-147)/(73-92) 140/85     Weight: 92.5 kg (203 lb 14.8 oz)  Body mass index is 30.11 kg/m².    Intake/Output Summary (Last 24 hours) at 6/4/2023 1500  Last data filed at 6/3/2023 2245  Gross per 24 hour   Intake 1320 ml   Output --   Net 1320 ml         Physical Exam  Constitutional:       Appearance: Normal appearance. He is normal weight.   HENT:      Head: Normocephalic and atraumatic.      Nose: Nose normal.      Mouth/Throat:      Mouth: Mucous membranes are moist.      Pharynx: Oropharynx is clear.   Eyes:      Extraocular Movements: Extraocular movements intact.      Conjunctiva/sclera: Conjunctivae normal.      Pupils: Pupils are equal, round, and reactive to light.   Cardiovascular:      Rate and Rhythm: Normal rate and regular rhythm.      Pulses: Normal pulses.      Heart sounds: Normal heart sounds.   Pulmonary:      Effort: Pulmonary effort is normal.      Breath sounds: Normal breath sounds.   Abdominal:      General: Bowel sounds are normal.      Palpations: Abdomen is soft.   Musculoskeletal:         General: Normal range of motion.      Cervical back: Normal range of motion and neck supple.   Skin:     General: Skin is warm and dry.      Capillary  Refill: Capillary refill takes 2 to 3 seconds.   Neurological:      General: No focal deficit present.      Mental Status: He is alert and oriented to person, place, and time. Mental status is at baseline.   Psychiatric:         Mood and Affect: Mood normal.         Behavior: Behavior normal.         Thought Content: Thought content normal.         Judgment: Judgment normal.           Significant Labs: All pertinent labs within the past 24 hours have been reviewed.  BMP:   Recent Labs   Lab 06/04/23  0147      K 3.8   CO2 26   BUN 15.0   CREATININE 0.96   CALCIUM 8.6   MG 1.90     CBC:   Recent Labs   Lab 06/03/23  0338 06/03/23  1134 06/04/23 0147   WBC  --   --  5.66   HGB 9.2* 8.9* 8.3*   HCT 27.7* 27.3* 25.6*   PLT  --   --  171     CMP:   Recent Labs   Lab 06/03/23  0338 06/04/23  0147    141   K 4.0 3.8   CO2 26 26   BUN 26.0* 15.0   CREATININE 0.77 0.96   CALCIUM 8.7 8.6   ALBUMIN  --  3.1*   BILITOT  --  0.3   ALKPHOS  --  43   AST  --  22   ALT  --  29     Magnesium:   Recent Labs   Lab 06/04/23  0147   MG 1.90       Significant Imaging: I have reviewed all pertinent imaging results/findings within the past 24 hours.

## 2023-06-04 NOTE — PROGRESS NOTES
"[facility]  Hospital Medicine  Progress Note    Patient Name: Jesús Meng  MRN: [id number]  Patient Class: OP- Observation   Admission Date: 6/2/2023  Length of Stay: 0 days  Attending Physician: Darryl Sifuentes MD  Primary Care Provider: Primary Doctor No        Subjective:     Principal Problem:Ulcer of esophagus without bleeding        HPI:  47 y.o.  male with a history that includes GERD, presented to [facility] ED with dark stools, associated with generalized weakness and syncope.  First he started this morning with "coughing up" some blood x 1.  No further vomiting or even nausea; but then started having dark, "black" stools.  Subsequently noted lightheadedness, even passed out at one point. Notes a distant history gastritis that required endoscopy.  He was told that gastritis was at least partially associated to his alcohol use.  His issues resolved and he has done well since, however does note intermittent heart burn for which he takes as needed OTC famotidine.  He notes he drinks occasionally, but he quit any heavy/regular drinking since his prior episode of gastritis.   Work up in ED noted H&H 9.3/27, heme-positive stools.  Denies any NSAID use, or alcohol use.  Patient transferred to [facility] for endoscopy      Overview/Hospital Course:  6/3/23-Patient underwent EGD today and was found to have a small ulceration.  H&H remained stable.  Surgery would like to check labs in am and keep on clears for now.  Otherwise the patient is stable.    6/4/23-Patient is doing well and voices no complaints at this time.  H&H did drop slightly likely due to some dilution.  Surgery wants to repeat labs in am.      Interval History:     Review of Systems   Constitutional: Negative.    HENT: Negative.     Eyes: Negative.    Respiratory: Negative.     Cardiovascular: Negative.    Gastrointestinal: Negative.    Endocrine: Negative.    Genitourinary: Negative.    Musculoskeletal: Negative.  "   Skin: Negative.    Allergic/Immunologic: Negative.    Neurological: Negative.    Hematological: Negative.    Psychiatric/Behavioral: Negative.     Objective:     Vital Signs (Most Recent):  Temp: 97.8 °F (36.6 °C) (06/04/23 1231)  Pulse: 61 (06/04/23 1231)  Resp: 20 (06/03/23 0000)  BP: (!) 140/85 (06/04/23 1231)  SpO2: 99 % (06/04/23 1231) Vital Signs (24h Range):  Temp:  [97.5 °F (36.4 °C)-99 °F (37.2 °C)] 97.8 °F (36.6 °C)  Pulse:  [61-74] 61  SpO2:  [96 %-100 %] 99 %  BP: (117-147)/(73-92) 140/85     Weight: 92.5 kg (203 lb 14.8 oz)  Body mass index is 30.11 kg/m².    Intake/Output Summary (Last 24 hours) at 6/4/2023 1500  Last data filed at 6/3/2023 2245  Gross per 24 hour   Intake 1320 ml   Output --   Net 1320 ml         Physical Exam  Constitutional:       Appearance: Normal appearance. He is normal weight.   HENT:      Head: Normocephalic and atraumatic.      Nose: Nose normal.      Mouth/Throat:      Mouth: Mucous membranes are moist.      Pharynx: Oropharynx is clear.   Eyes:      Extraocular Movements: Extraocular movements intact.      Conjunctiva/sclera: Conjunctivae normal.      Pupils: Pupils are equal, round, and reactive to light.   Cardiovascular:      Rate and Rhythm: Normal rate and regular rhythm.      Pulses: Normal pulses.      Heart sounds: Normal heart sounds.   Pulmonary:      Effort: Pulmonary effort is normal.      Breath sounds: Normal breath sounds.   Abdominal:      General: Bowel sounds are normal.      Palpations: Abdomen is soft.   Musculoskeletal:         General: Normal range of motion.      Cervical back: Normal range of motion and neck supple.   Skin:     General: Skin is warm and dry.      Capillary Refill: Capillary refill takes 2 to 3 seconds.   Neurological:      General: No focal deficit present.      Mental Status: He is alert and oriented to person, place, and time. Mental status is at baseline.   Psychiatric:         Mood and Affect: Mood normal.         Behavior:  Behavior normal.         Thought Content: Thought content normal.         Judgment: Judgment normal.           Significant Labs: All pertinent labs within the past 24 hours have been reviewed.  BMP:   Recent Labs   Lab 06/04/23  0147      K 3.8   CO2 26   BUN 15.0   CREATININE 0.96   CALCIUM 8.6   MG 1.90     CBC:   Recent Labs   Lab 06/03/23  0338 06/03/23  1134 06/04/23  0147   WBC  --   --  5.66   HGB 9.2* 8.9* 8.3*   HCT 27.7* 27.3* 25.6*   PLT  --   --  171     CMP:   Recent Labs   Lab 06/03/23  0338 06/04/23  0147    141   K 4.0 3.8   CO2 26 26   BUN 26.0* 15.0   CREATININE 0.77 0.96   CALCIUM 8.7 8.6   ALBUMIN  --  3.1*   BILITOT  --  0.3   ALKPHOS  --  43   AST  --  22   ALT  --  29     Magnesium:   Recent Labs   Lab 06/04/23  0147   MG 1.90       Significant Imaging: I have reviewed all pertinent imaging results/findings within the past 24 hours.      Assessment/Plan:      * Ulcer of esophagus without bleeding  PPI  Follow labs  Clears for now        VTE Risk Mitigation (From admission, onward)         Ordered     IP VTE HIGH RISK PATIENT  Once         06/02/23 1645     Place sequential compression device  Until discontinued         06/02/23 1645              Check CBC in am and if stable d/c  Surgery wanting to observe an additional night  Resume current meds  Full liquid diet  Discharge Planning   TIMBO:      Code Status: Full Code   Is the patient medically ready for discharge?:     Reason for patient still in hospital (select all that apply): Patient trending condition, Laboratory test, Treatment, Consult recommendations and Pending disposition                     Alvin Day MD  Department of Hospital Medicine   [facility]

## 2023-06-05 VITALS
BODY MASS INDEX: 30.21 KG/M2 | HEART RATE: 60 BPM | WEIGHT: 203.94 LBS | SYSTOLIC BLOOD PRESSURE: 145 MMHG | RESPIRATION RATE: 20 BRPM | DIASTOLIC BLOOD PRESSURE: 91 MMHG | HEIGHT: 69 IN | TEMPERATURE: 98 F | OXYGEN SATURATION: 99 %

## 2023-06-05 LAB
BASOPHILS # BLD AUTO: 0.03 X10(3)/MCL
BASOPHILS NFR BLD AUTO: 0.6 %
EOSINOPHIL # BLD AUTO: 0.11 X10(3)/MCL (ref 0–0.9)
EOSINOPHIL NFR BLD AUTO: 2 %
ERYTHROCYTE [DISTWIDTH] IN BLOOD BY AUTOMATED COUNT: 13.2 % (ref 11.5–17)
HCT VFR BLD AUTO: 25 % (ref 42–52)
HGB BLD-MCNC: 8.1 G/DL (ref 14–18)
IMM GRANULOCYTES # BLD AUTO: 0.01 X10(3)/MCL (ref 0–0.04)
IMM GRANULOCYTES NFR BLD AUTO: 0.2 %
LYMPHOCYTES # BLD AUTO: 2.46 X10(3)/MCL (ref 0.6–4.6)
LYMPHOCYTES NFR BLD AUTO: 45.5 %
MCH RBC QN AUTO: 29.7 PG (ref 27–31)
MCHC RBC AUTO-ENTMCNC: 32.4 G/DL (ref 33–36)
MCV RBC AUTO: 91.6 FL (ref 80–94)
MONOCYTES # BLD AUTO: 0.53 X10(3)/MCL (ref 0.1–1.3)
MONOCYTES NFR BLD AUTO: 9.8 %
NEUTROPHILS # BLD AUTO: 2.27 X10(3)/MCL (ref 2.1–9.2)
NEUTROPHILS NFR BLD AUTO: 41.9 %
PLATELET # BLD AUTO: 184 X10(3)/MCL (ref 130–400)
PMV BLD AUTO: 11.9 FL (ref 7.4–10.4)
RBC # BLD AUTO: 2.73 X10(6)/MCL (ref 4.7–6.1)
WBC # SPEC AUTO: 5.41 X10(3)/MCL (ref 4.5–11.5)

## 2023-06-05 PROCEDURE — 25000003 PHARM REV CODE 250: Performed by: INTERNAL MEDICINE

## 2023-06-05 PROCEDURE — C9113 INJ PANTOPRAZOLE SODIUM, VIA: HCPCS | Performed by: INTERNAL MEDICINE

## 2023-06-05 PROCEDURE — 85025 COMPLETE CBC W/AUTO DIFF WBC: CPT | Performed by: INTERNAL MEDICINE

## 2023-06-05 PROCEDURE — 63600175 PHARM REV CODE 636 W HCPCS: Performed by: INTERNAL MEDICINE

## 2023-06-05 PROCEDURE — G0378 HOSPITAL OBSERVATION PER HR: HCPCS

## 2023-06-05 PROCEDURE — 94761 N-INVAS EAR/PLS OXIMETRY MLT: CPT

## 2023-06-05 RX ORDER — MUPIROCIN 20 MG/G
OINTMENT TOPICAL 2 TIMES DAILY
Status: DISCONTINUED | OUTPATIENT
Start: 2023-06-05 | End: 2023-06-05 | Stop reason: HOSPADM

## 2023-06-05 RX ORDER — PANTOPRAZOLE SODIUM 40 MG/1
40 TABLET, DELAYED RELEASE ORAL DAILY
Qty: 30 TABLET | Refills: 4 | Status: SHIPPED | OUTPATIENT
Start: 2023-06-05 | End: 2023-11-02

## 2023-06-05 RX ADMIN — MUPIROCIN 1 G: 20 OINTMENT TOPICAL at 08:06

## 2023-06-05 RX ADMIN — PANTOPRAZOLE SODIUM 40 MG: 40 INJECTION, POWDER, FOR SOLUTION INTRAVENOUS at 08:06

## 2023-06-05 NOTE — DISCHARGE INSTRUCTIONS
Discharge instructions reviewed with patient and spouse at bedside using  Michelet ID number [id number] @[id number]    CLYDE POR ELEGIR TEVIN PARA GREEN CUIDADO  FUE UN PLACER ATENDERTE

## 2023-06-05 NOTE — DISCHARGE SUMMARY
"[facility]  Hospital Medicine  Discharge Summary      Patient Name: Jesús Meng  MRN: [id number]  EMMA: [id number]  Patient Class: OP- Observation  Admission Date: 6/2/2023  Hospital Length of Stay: 0 days  Discharge Date and Time:  06/05/2023 9:50 AM  Attending Physician: Darryl Sifuentes MD   Discharging Provider: Alvin Day MD  Primary Care Provider: Primary Doctor Carina    Primary Care Team: Networked reference to record PCT     HPI:   47 y.o.  male with a history that includes GERD, presented to [facility] ED with dark stools, associated with generalized weakness and syncope.  First he started this morning with "coughing up" some blood x 1.  No further vomiting or even nausea; but then started having dark, "black" stools.  Subsequently noted lightheadedness, even passed out at one point. Notes a distant history gastritis that required endoscopy.  He was told that gastritis was at least partially associated to his alcohol use.  His issues resolved and he has done well since, however does note intermittent heart burn for which he takes as needed OTC famotidine.  He notes he drinks occasionally, but he quit any heavy/regular drinking since his prior episode of gastritis.   Work up in ED noted H&H 9.3/27, heme-positive stools.  Denies any NSAID use, or alcohol use.  Patient transferred to [facility] for endoscopy      Procedure(s) (LRB):  EGD (ESOPHAGOGASTRODUODENOSCOPY) (N/A)      Hospital Course:   6/3/23-Patient underwent EGD today and was found to have a small ulceration.  H&H remained stable.  Surgery would like to check labs in am and keep on clears for now.  Otherwise the patient is stable.    6/4/23-Patient is doing well and voices no complaints at this time.  H&H did drop slightly likely due to some dilution.  Surgery wants to repeat labs in am.    6/5/23-Patient is doing well at this time.  He was found to have a small ulceration with no active bleeding.  He iss " table for discharge home today.  Exam(A&O, NAD, RRR, CTA, BS +, ROM I)       Goals of Care Treatment Preferences:  Code Status: Full Code      Consults:   Consults (From admission, onward)        Status Ordering Provider     Inpatient consult to General Surgery  Once        Provider:  Darryl Ortiz MD    Acknowledged DARRYL ORTIZ          No new Assessment & Plan notes have been filed under this hospital service since the last note was generated.  Service: Hospital Medicine    Final Active Diagnoses:    Diagnosis Date Noted POA    PRINCIPAL PROBLEM:  Ulcer of esophagus without bleeding [K22.10] 06/03/2023 Unknown      Problems Resolved During this Admission:       Discharged Condition: stable    Disposition: Home or Self Care    Follow Up:   Follow-up Information     Primary Doctor No Follow up.           Eli Loaiza MD Follow up in 1 week(s).    Specialty: General Surgery  Contact information:  [address] [place] [address] Eileen Puente [place] [id number] [phone]                       Patient Instructions:   No discharge procedures on file.    Significant Diagnostic Studies: Labs:   BMP:   Recent Labs   Lab 06/04/23  0147      K 3.8   CO2 26   BUN 15.0   CREATININE 0.96   CALCIUM 8.6   MG 1.90   , CMP   Recent Labs   Lab 06/04/23  0147      K 3.8   CO2 26   BUN 15.0   CREATININE 0.96   CALCIUM 8.6   ALBUMIN 3.1*   BILITOT 0.3   ALKPHOS 43   AST 22   ALT 29    and CBC   Recent Labs   Lab 06/03/23  1134 06/04/23  0147 06/05/23  0338   WBC  --  5.66 5.41   HGB 8.9* 8.3* 8.1*   HCT 27.3* 25.6* 25.0*   PLT  --  171 184       Pending Diagnostic Studies:     None         Medications:  Reconciled Home Medications:      Medication List      START taking these medications    pantoprazole 40 MG tablet  Commonly known as: PROTONIX  Take 1 tablet (40 mg total) by mouth once daily.            Indwelling Lines/Drains at time of discharge:   Lines/Drains/Airways     None                 Time spent on the discharge of patient:  33 minutes         Alvin Day MD  Department of Hospital Medicine  [facility]

## 2023-08-16 LAB
INR PPP: 1
PROTHROMBIN TIME: 13.3 SECONDS (ref 11.4–14)

## (undated) DEVICE — KIT SURGICAL COLON .25 1.1OZ